# Patient Record
Sex: MALE | Race: WHITE | Employment: UNEMPLOYED | URBAN - METROPOLITAN AREA
[De-identification: names, ages, dates, MRNs, and addresses within clinical notes are randomized per-mention and may not be internally consistent; named-entity substitution may affect disease eponyms.]

---

## 2017-06-22 ENCOUNTER — TELEPHONE (OUTPATIENT)
Dept: PULMONOLOGY | Age: 27
End: 2017-06-22

## 2017-06-27 ENCOUNTER — OFFICE VISIT (OUTPATIENT)
Dept: PULMONOLOGY | Age: 27
End: 2017-06-27

## 2017-06-27 VITALS
SYSTOLIC BLOOD PRESSURE: 111 MMHG | WEIGHT: 181 LBS | RESPIRATION RATE: 16 BRPM | BODY MASS INDEX: 22.5 KG/M2 | OXYGEN SATURATION: 98 % | HEART RATE: 72 BPM | HEIGHT: 75 IN | DIASTOLIC BLOOD PRESSURE: 71 MMHG

## 2017-06-27 DIAGNOSIS — R91.8 PULMONARY NODULES: Primary | ICD-10-CM

## 2017-06-27 DIAGNOSIS — J43.9 PULMONARY EMPHYSEMA, UNSPECIFIED EMPHYSEMA TYPE (HCC): ICD-10-CM

## 2017-06-27 DIAGNOSIS — R06.00 DYSPNEA, UNSPECIFIED TYPE: ICD-10-CM

## 2017-06-27 PROCEDURE — 99204 OFFICE O/P NEW MOD 45 MIN: CPT | Performed by: INTERNAL MEDICINE

## 2017-06-27 RX ORDER — BUPROPION HYDROCHLORIDE 150 MG/1
TABLET ORAL
COMMUNITY
Start: 2017-06-12 | End: 2020-07-02

## 2017-06-27 RX ORDER — ALBUTEROL SULFATE 90 UG/1
2 AEROSOL, METERED RESPIRATORY (INHALATION) EVERY 6 HOURS PRN
Qty: 1 INHALER | Refills: 3 | Status: SHIPPED | OUTPATIENT
Start: 2017-06-27 | End: 2020-07-02

## 2017-12-14 ENCOUNTER — TELEPHONE (OUTPATIENT)
Dept: PULMONOLOGY | Age: 27
End: 2017-12-14

## 2020-07-02 ENCOUNTER — TELEPHONE (OUTPATIENT)
Dept: PULMONOLOGY | Age: 30
End: 2020-07-02

## 2020-07-02 ENCOUNTER — VIRTUAL VISIT (OUTPATIENT)
Dept: PULMONOLOGY | Age: 30
End: 2020-07-02
Payer: COMMERCIAL

## 2020-07-02 PROCEDURE — 99204 OFFICE O/P NEW MOD 45 MIN: CPT | Performed by: INTERNAL MEDICINE

## 2020-07-02 RX ORDER — VARENICLINE TARTRATE
KIT
Qty: 1 BOX | Refills: 0 | Status: SHIPPED | OUTPATIENT
Start: 2020-07-02

## 2020-07-02 RX ORDER — ALBUTEROL SULFATE 90 UG/1
2 AEROSOL, METERED RESPIRATORY (INHALATION) EVERY 6 HOURS PRN
Qty: 1 INHALER | Refills: 6 | Status: SHIPPED | OUTPATIENT
Start: 2020-07-02

## 2020-07-02 RX ORDER — VARENICLINE TARTRATE 1 MG/1
1 TABLET, FILM COATED ORAL 2 TIMES DAILY
Qty: 60 TABLET | Refills: 3 | Status: SHIPPED | OUTPATIENT
Start: 2020-07-02

## 2020-07-02 NOTE — TELEPHONE ENCOUNTER

## 2020-07-02 NOTE — PROGRESS NOTES
[facility]                                                          TELEHEALTH EVALUATION: Service performed was Audio/Visual (During CKXMT-21 public health emergency) and not a face-to-face visit           CHIEF COMPLAINT: SOB    Consulting provider: Donald Casas PA-C      HPI:   Progressive dyspnea for the past 3 years. Dyspnea worse with exertion and better with resting. Moderate to severe. Stairs and conversations the worst. Associated with cough occasional am sputum, 1-2 times/day, white and yellow. No hemoptysis. Smoker now 2 ppd, used to be 3 ppd- total of 13 years. Last used Albuterol with help back in the past. Patient was seen 2017, however did not follow through with this testing, including CT chest, PFTs, and 6-minute walk. Past Medical History:   Diagnosis Date    Chronic back pain 2006       Past Surgical History:        Procedure Laterality Date    APPENDECTOMY      CYSTOSCOPY  4/23/2014    u dil    OTHER SURGICAL HISTORY      teeth extraction       Allergies:  has No Known Allergies. Social History:    TOBACCO:   reports that he has been smoking cigarettes. He has a 30.00 pack-year smoking history. He has never used smokeless tobacco.  ETOH:   reports no history of alcohol use.       Family History:       Problem Relation Age of Onset    Hypertension Mother        Current Medications:    Current Outpatient Medications:     albuterol sulfate HFA (PROVENTIL HFA) 108 (90 Base) MCG/ACT inhaler, Inhale 2 puffs into the lungs every 6 hours as needed for Wheezing, Disp: 1 Inhaler, Rfl: 3      REVIEW OF SYSTEMS:  Constitutional: Negative for fever  HENT: Negative for sore throat  Eyes: Negative for redness   Respiratory: + dyspnea, cough  Cardiovascular: Negative for chest pain  Gastrointestinal: Negative for vomiting, diarrhea   Genitourinary: Negative for hematuria   Musculoskeletal: Negative for arthralgias   Skin: Negative for rash  Neurological: Negative for syncope  Hematological: Negative for adenopathy  Psychiatric/Behavorial: Negative for anxiety      Objective:   PHYSICAL EXAM:    There were no vitals taken for this visit.' on RA  O2 Sat:  HR:  BP:  RR:  Mallampati class IV. Temperature:  Constitutional:  No acute distress. Appears well developed and nourished. Eyes: No sclera icterus. EOM intact. No visible discharge. HENT: Head is normocephalic and atraumatic. Mucus membranes are moist and the tongue appears normal. Normal appearing nose. External Ears normal.   Neck: No visualized mass. Adis Fent is midline   Resp: No accessory muscle use. Respiratory effort normal. No visualized signs of difficulty breathing or respiratory distress. Cardiovascular: Some LE edema per patient   Musculoskeletal: Normal gait with no signs of ataxia. Normal range of motion of the neck. Skin: No significant exanthematous lesions or discoloration noted on facial skin    Neuro: Awake. Alert. Able to follow commands. No facial asymmetry. No gaze palsy. Psych: No agitation. Normal affect. No hallucinations. Oriented to person/time/place. No anxiety. Normal judgement and insight. DATA reviewed by me:   CT chest 6/8/2017  images reviewed by me and showed:   Mediastinum: No mediastinal hematoma. No evidence of acute traumatic aortic injury. No pericardial effusion. Lungs/pleura: There is emphysema in the lung apices. There are multiple small pulmonary nodules scattered throughout both lungs. There is no airspace disease. Pleural spaces are clear. Soft Tissues/Bones: No fractures are demonstrated         Assessment:       · Dyspnea, cough and wheezes - likely underlying COPD   · Pulmonary nodules on CT chest 6/8/2017. Favor granuloma.  Can not exclude malignancy given smoking history   · Pulmonary emphysema   · 26 pack years smoking       Plan:       · PFTs and 6MW  · CT chest follow up nodule   · Trial Spiriva Respimat: Two inhalations (5 mcg) once daily (maximum: 2 inhalations per 24 hours)  · Trial of Albuterol 2 puffs Q4-6 hrs PRN  · Smoking cessation counseling. Patient was advised to visit [email or web address] and call 1800QUITNOW for assistance. Will refer to [address] [employer] smoking cessation program.   · [place] [employer]. Risks, benefits and side effects were discussed with patient. · Follow up in 4 weeks             Gabby Mccoy is a 26 y.o. male being evaluated by a Virtual Visit (video visit) encounter to address concerns as mentioned above. A caregiver was present when appropriate. Due to this being a TeleHealth encounter (During [facility]-83 public health emergency), evaluation of the following organ systems was limited: Vitals/Constitutional/EENT/Resp/CV/GI//MS/Neuro/Skin/Heme-Lymph-Imm. Pursuant to the emergency declaration under the [address] [place] [address] authority and the [employer] and [employer] Act, this Virtual Visit was conducted with patient's (and/or legal guardian's) consent, to reduce the patient's risk of exposure to COVID-19 and provide necessary medical care. The patient (and/or legal guardian) has also been advised to contact this office for worsening conditions or problems, and seek emergency medical treatment and/or call 911 if deemed necessary. Services were provided through a video synchronous discussion virtually to substitute for in-person clinic visit. Patient was located in her home, provider was located in his office. --Gema Hayward MD on 7/2/2020 at 8:35 AM    An electronic signature was used to authenticate this note.

## 2020-07-15 ENCOUNTER — HOSPITAL ENCOUNTER (OUTPATIENT)
Dept: CT IMAGING | Age: 30
Discharge: HOME | End: 2020-07-15
Payer: COMMERCIAL

## 2020-07-15 ENCOUNTER — HOSPITAL ENCOUNTER (OUTPATIENT)
Dept: PULMONOLOGY | Age: 30
Discharge: HOME | End: 2020-07-15
Payer: COMMERCIAL

## 2020-07-15 PROCEDURE — 94060 EVALUATION OF WHEEZING: CPT

## 2020-07-15 PROCEDURE — 94729 DIFFUSING CAPACITY: CPT

## 2020-07-15 PROCEDURE — 6370000000 HC RX 637 (ALT 250 FOR IP): Performed by: INTERNAL MEDICINE

## 2020-07-15 PROCEDURE — 71250 CT THORAX DX C-: CPT

## 2020-07-15 PROCEDURE — 94726 PLETHYSMOGRAPHY LUNG VOLUMES: CPT

## 2020-07-15 PROCEDURE — 94640 AIRWAY INHALATION TREATMENT: CPT

## 2020-07-15 PROCEDURE — 94618 PULMONARY STRESS TESTING: CPT

## 2020-07-15 RX ORDER — ALBUTEROL SULFATE 90 UG/1
2 AEROSOL, METERED RESPIRATORY (INHALATION) ONCE
Status: COMPLETED | OUTPATIENT
Start: 2020-07-15 | End: 2020-07-15

## 2020-07-15 RX ADMIN — Medication 2 PUFF: at 16:37

## 2020-07-21 ENCOUNTER — VIRTUAL VISIT (OUTPATIENT)
Dept: PULMONOLOGY | Age: 30
End: 2020-07-21
Payer: COMMERCIAL

## 2020-07-21 PROCEDURE — 99214 OFFICE O/P EST MOD 30 MIN: CPT | Performed by: INTERNAL MEDICINE

## 2020-07-21 NOTE — PROGRESS NOTES
[facility]                                                          TELEHEALTH EVALUATION: Service performed was Audio/Visual (During IUAOI-28 public health emergency) and not a face-to-face visit           CHIEF COMPLAINT: follow up tests         HPI:   CT chest and PFTs reviewed by me and noted below. Results were dicussed with patient and multiple good questions were answered. Doing better   Uses Spiriva once a day   Smoking still - 2 ppd   No hemoptysis             Past Medical History:   Diagnosis Date    Chronic back pain 2006       Past Surgical History:        Procedure Laterality Date    APPENDECTOMY      CYSTOSCOPY  4/23/2014    u dil    OTHER SURGICAL HISTORY      teeth extraction       Allergies:  has No Known Allergies. Social History:    TOBACCO:   reports that he has been smoking cigarettes. He has a 30.00 pack-year smoking history. He has never used smokeless tobacco.  ETOH:   reports no history of alcohol use. Family History:       Problem Relation Age of Onset    Hypertension Mother        Current Medications:    Current Outpatient Medications:     albuterol sulfate HFA (VENTOLIN HFA) 108 (90 Base) MCG/ACT inhaler, Inhale 2 puffs into the lungs every 6 hours as needed for Wheezing or Shortness of Breath, Disp: 1 Inhaler, Rfl: 6    varenicline (CHANTIX STARTING MONTH PAK) 0.5 MG X 11 & 1 MG X 42 tablet, Take by mouth., Disp: 1 box, Rfl: 0    varenicline (CHANTIX CONTINUING MONTH JOSEPH) 1 MG tablet, Take 1 tablet by mouth 2 times daily, Disp: 60 tablet, Rfl: 3    tiotropium (SPIRIVA RESPIMAT) 2.5 MCG/ACT AERS inhaler, Inhale 2 puffs into the lungs daily, Disp: 1 Inhaler, Rfl: 6        Objective:   PHYSICAL EXAM:    There were no vitals taken for this visit.' on RA  O2 Sat:  HR:  BP:  RR:  Mallampati class IV. Temperature:  Constitutional:  No acute distress. Appears well developed and nourished.   Eyes: No sclera icterus. EOM intact. No visible discharge. HENT: Head is normocephalic and atraumatic. Mucus membranes are moist and the tongue appears normal. Normal appearing nose. External Ears normal.   Neck: No visualized mass. Earnie Breath is midline   Resp: No accessory muscle use. Respiratory effort normal. No visualized signs of difficulty breathing or respiratory distress. Cardiovascular: Some LE edema per patient   Musculoskeletal: Normal gait with no signs of ataxia. Normal range of motion of the neck. Skin: No significant exanthematous lesions or discoloration noted on facial skin    Neuro: Awake. Alert. Able to follow commands. No facial asymmetry. No gaze palsy. Psych: No agitation. Normal affect. No hallucinations. Oriented to person/time/place. No anxiety. Normal judgement and insight. DATA reviewed by me:   PFTs 07/17/2020 FVC(%) FEV1 5.27 (103 %) FEV1/FVC 84 % TLC 8.55 (103 %) DLCO 24.25 (59 %) 6MW F Res Ex        CT chest 6/8/2017    Mediastinum: No mediastinal hematoma. No evidence of acute traumatic aortic injury. No pericardial effusion. Lungs/pleura: There is emphysema in the lung apices. There are multiple small pulmonary nodules scattered throughout both lungs. There is no airspace disease. Pleural spaces are clear. Soft Tissues/Bones: No fractures are demonstrated     CT chest 7/15/2020 imaging reviewed by me and showed  1. No acute airspace consolidation. 2. Slight interval progression of nonspecific ground-glass opacity within the  right upper lobe, with the largest nodular opacity measuring approximately 29  x 20 mm within the right upper lobe. Multiple additional scattered  subcentimeter nodules within the bilateral upper lobes are stable and  unchanged dating back to 06/08/2017, likely benign given their stability.   However, since the largest ground-glass opacity is slightly increased in size  from prior studies, suggest chest CT follow-up in 6-12 months to document  continued stability of these ground-glass nodules. Assessment:       · Pulmonary emphysema- improving     · Decreased diffusion capacity-secondary to above  · RUL GGO, slightly progressed on repeat CT 7/15/2020 measuring 2 cm. Favor inflammatory. Smoking-related ILD is on the differential.  Malignancy felt less likely. · Pulmonary nodules on CT chest 6/8/2017. Stable on repeat CT 7/15/2020 and likely granulomas  · 26 pack years smoking       Plan:       · CT chest and PFTs in 6-12 months  · Continue Spiriva daily and Albuterol 2 puffs Q4-6 hrs PRN  · Smoking cessation counseling  · Advised to get influenza vaccine this year   · Follow-up after CT            Emerald Ramey is a 26 y.o. male being evaluated by a Virtual Visit (video visit) encounter to address concerns as mentioned above. A caregiver was present when appropriate. Due to this being a TeleHealth encounter (During [place] [address] public health emergency), evaluation of the following organ systems was limited: Vitals/Constitutional/EENT/Resp/CV/GI//MS/Neuro/Skin/Heme-Lymph-Imm. Pursuant to the emergency declaration under the [address] [place] [address], [address] [place] [address] authority and the [employer] and [employer] Act, this Virtual Visit was conducted with patient's (and/or legal guardian's) consent, to reduce the patient's risk of exposure to COVID-19 and provide necessary medical care. The patient (and/or legal guardian) has also been advised to contact this office for worsening conditions or problems, and seek emergency medical treatment and/or call 911 if deemed necessary. Services were provided through a video synchronous discussion virtually to substitute for in-person clinic visit. Patient was located in her home, provider was located in his office. --Jimena Nazario MD on 7/21/2020 at 9:48 AM    An electronic signature was used to authenticate this note.

## 2021-02-03 ENCOUNTER — TELEPHONE (OUTPATIENT)
Dept: PULMONOLOGY | Age: 31
End: 2021-02-03

## 2021-02-03 NOTE — TELEPHONE ENCOUNTER
Patient cancelled appointment on 2/4/21 with Dr David Dutta for PFT/Ct f/u. Reason: unable to make    Patient did not reschedule appointment. Appointment rescheduled for mom said she will call back to yousif appts. Last OV 7/21/20    Assessment:       · Pulmonary emphysema- improving     · Decreased diffusion capacity-secondary to above  · RUL GGO, slightly progressed on repeat CT 7/15/2020 measuring 2 cm. Favor inflammatory. Smoking-related ILD is on the differential.  Malignancy felt less likely. · Pulmonary nodules on CT chest 6/8/2017.   Stable on repeat CT 7/15/2020 and likely granulomas  · 26 pack years smoking       Plan:       · CT chest and PFTs in 6-12 months  · Continue Spiriva daily and Albuterol 2 puffs Q4-6 hrs PRN  · Smoking cessation counseling  · Advised to get influenza vaccine this year   · Follow-up after CT

## 2021-02-08 NOTE — TELEPHONE ENCOUNTER
Patients mother has not yet reached out to r/s. Left message asking Kristen Srivastava to return call to office.

## 2021-02-11 NOTE — TELEPHONE ENCOUNTER
Spoke with Serafin Small whom states pt is currently in a shelter center due to failure to appear to counseling? He is scheduled for court date on 2/18/21. Serafin Small is not sure when patient will be released.

## 2021-05-03 ENCOUNTER — TELEPHONE (OUTPATIENT)
Dept: PULMONOLOGY | Age: 31
End: 2021-05-03

## 2021-05-03 NOTE — TELEPHONE ENCOUNTER
Patient cancelled appointment on 5/26/21 with Dr Pham Wetzel for Ct chest PFT f/u. Reason: has another appt    Patient did not reschedule appointment. Appointment rescheduled for n/a. Patients mom states she will have to call back to yousif appts. Last OV 7/21/20    Assessment:       · Pulmonary emphysema- improving     · Decreased diffusion capacity-secondary to above  · RUL GGO, slightly progressed on repeat CT 7/15/2020 measuring 2 cm. Favor inflammatory. Smoking-related ILD is on the differential.  Malignancy felt less likely. · Pulmonary nodules on CT chest 6/8/2017.   Stable on repeat CT 7/15/2020 and likely granulomas  · 26 pack years smoking       Plan:       · CT chest and PFTs in 6-12 months  · Continue Spiriva daily and Albuterol 2 puffs Q4-6 hrs PRN  · Smoking cessation counseling  · Advised to get influenza vaccine this year   · Follow-up after CT

## 2025-04-13 ENCOUNTER — HOSPITAL ENCOUNTER (OUTPATIENT)
Dept: GENERAL RADIOLOGY | Age: 35
Discharge: HOME | End: 2025-04-13
Payer: COMMERCIAL

## 2025-04-13 DIAGNOSIS — R05.3 PERSISTENT COUGH: ICD-10-CM

## 2025-04-13 PROCEDURE — 71046 X-RAY EXAM CHEST 2 VIEWS: CPT

## 2025-06-30 ENCOUNTER — TRANSCRIBE ORDERS (OUTPATIENT)
Dept: ADMINISTRATIVE | Age: 35
End: 2025-06-30

## 2025-06-30 DIAGNOSIS — R22.32 LOCALIZED SWELLING, MASS AND LUMP, LEFT UPPER LIMB: Primary | ICD-10-CM
